# Patient Record
Sex: MALE | Race: WHITE | NOT HISPANIC OR LATINO | Employment: OTHER | URBAN - METROPOLITAN AREA
[De-identification: names, ages, dates, MRNs, and addresses within clinical notes are randomized per-mention and may not be internally consistent; named-entity substitution may affect disease eponyms.]

---

## 2017-11-22 ENCOUNTER — OFFICE VISIT (OUTPATIENT)
Dept: URGENT CARE | Facility: CLINIC | Age: 47
End: 2017-11-22

## 2017-11-22 PROCEDURE — 93005 ELECTROCARDIOGRAM TRACING: CPT

## 2017-11-22 PROCEDURE — 99213 OFFICE O/P EST LOW 20 MIN: CPT

## 2017-11-23 NOTE — PROGRESS NOTES
Assessment    1  Shortness of breath (786 05) (R06 02)  2  Sinus tachycardia by electrocardiogram (785 0) (R00 0)  3  Murmur (785 2) (R01 1)    Discussion/Summary  Discussion Summary:   Go directly to [facility] for further evaluation and treatment of your symptoms  EKG shows evidence of a sinus tachycardia  Physical exam shows evidence of a holosystolic murmur  He declined ambulance transportation to the hospital    Medication Side Effects Reviewed: Possible side effects of new medications were reviewed with the patient/guardian today  Understands and agrees with treatment plan: The treatment plan was reviewed with the patient/guardian  The patient/guardian understands and agrees with the treatment plan      Chief Complaint    1  Shortness of Breath  Chief Complaint Free Text Note Form: Patient had flu like symptoms about 6 weeks ago  Since then he has developed SOB and feels that his heart beat is fast and irregular  2  Patient denies cheat pain, but does say when he goes to bed he feel acid reflex coming up  [facility] ER called spoke with charge nurse Renee report given and history  1        1 Amended By: Tanya Cassidy; Nov 22 2017 10:58 AM EST   2 Amended By: Marilou Velez; Nov 22 2017 11:06 AM EST    History of Present Illness  HPI: Patient presents today with complaints of worsening shortness of breath  He states he has been having symptoms for the last 6 weeks  Got worse over the last week after he went hunting  He noticed that he could only walk short distances and had a sit and catch his breath  He now relates that he hears a little bit of gurgling in the morning when he wakes up  He also feels that his heart rate is fast  He denies any true chest pain  He states he did have a cardiac workup years ago after back surgery when he was having some chest discomfort the workup was normal  He denies any history of murmurs  He is a nonsmoker   Past medical history is otherwise significant for borderline hypertension  He denies any nausea or vomiting  Hospital Based Practices Required Assessment:  Pain Assessment  the patient states they do not have pain  Abuse And Domestic Violence Screen   Yes, the patient is safe at home  -- The patient states no one is hurting them  Depression And Suicide Screen  No, the patient has not had thoughts of hurting themself  No, the patient has not felt depressed in the past 7 days  Prefered Language is  [place]  Primary Language is  English  Review of Systems  Focused-Male:  Constitutional: no fever or chills, feels well, no tiredness, no recent weight loss or weight gain  ENT: no complaints of earache, no loss of hearing, no nosebleeds or nasal discharge, no sore throat or hoarseness  Cardiovascular: fast heart rate-- and-- palpitations  Respiratory: shortness of breath  Gastrointestinal: no complaints of abdominal pain, no constipation, no nausea or vomiting, no diarrhea or bloody stools  Genitourinary: no complaints of dysuria or incontinence, no hesitancy, no nocturia, no genital lesion, no inadequacy of penile erection  Past Medical History  1  History of hypertension (V12 59) (Z86 79)  Active Problems And Past Medical History Reviewed: The active problems and past medical history were reviewed and updated today  Family History  Family History Reviewed: The family history was reviewed and updated today  Social History   · Never a smoker   · Social drinker (V49 89) (Z78 9)  Social History Reviewed: The social history was reviewed and updated today  Surgical History  Surgical History Reviewed: The surgical history was reviewed and updated today  Current Meds  1  No Reported Medications Recorded  Medication List Reviewed: The medication list was reviewed and updated today  Allergies    1   Penicillins    Vitals  Signs   Recorded: 22Nov2017 10:37AM   Heart Rate: 118  Recorded: 22Nov2017 10:33AM   Temperature: 98 4 F  Heart Rate: 224  Respiration: 18  Systolic: 972  Diastolic: 92  Height: 5 ft 10 in  Weight: 239 lb   BMI Calculated: 34 29  BSA Calculated: 2 25  O2 Saturation: 97    Physical Exam   Constitutional  General appearance: Abnormal  -- Appears slightly anxious  Eyes  Conjunctiva and lids: No swelling, erythema, or discharge  Pupils and irises: Equal, round and reactive to light  Ears, Nose, Mouth, and Throat  External inspection of ears and nose: Normal    Otoscopic examination: Tympanic membrance translucent with normal light reflex  Canals patent without erythema  Nasal mucosa, septum, and turbinates: Normal without edema or erythema  -- Poor dentition  Pulmonary  Respiratory effort: Abnormal    Auscultation of lungs: Abnormal  -- decreased breath sounds with crackles in the right base  Cardiovascular  Palpation of heart: Normal PMI, no thrills  Auscultation of heart: Abnormal  -- Holosystolic murmur auscultated  Examination of extremities for edema and/or varicosities: Normal    Abdomen  Abdomen: Abnormal  -- Obesity  Soft and nontender  Skin  Skin and subcutaneous tissue: Normal without rashes or lesions  Additional Exam:  no pedal edema  Results/Data  ECG:  Rhythm and rate:1  sinus tachycardia1   Comparison to prior ECGs:1   No prior ECGs were available for comparison1   Diagnostic Studies Reviewed: I personally reviewed the films/images/results in the office today  My interpretation follows  1        1 Amended By: Melissa Lanes;  Nov 22 2017 12:26 PM EST    Signatures   Electronically signed by : Bernabe Bui DO; Nov 22 2017 10:57AM EST                       (Author)    Electronically signed by : Bernabe Bui DO; Nov 22 2017 11:08AM EST                       (Author)    Electronically signed by : Bernabe Bui DO; Nov 22 2017 12:28PM EST                       (Author)

## 2017-11-24 LAB
ATRIAL RATE: 116 BPM
P AXIS: 59 DEGREES
PR INTERVAL: 146 MS
QRS AXIS: 23 DEGREES
QRSD INTERVAL: 90 MS
QT INTERVAL: 318 MS
QTC INTERVAL: 442 MS
T WAVE AXIS: 70 DEGREES
VENTRICULAR RATE: 116 BPM